# Patient Record
Sex: FEMALE | URBAN - METROPOLITAN AREA
[De-identification: names, ages, dates, MRNs, and addresses within clinical notes are randomized per-mention and may not be internally consistent; named-entity substitution may affect disease eponyms.]

---

## 2019-07-25 ENCOUNTER — OFFICE VISIT (OUTPATIENT)
Dept: FAMILY MEDICINE CLINIC | Facility: CLINIC | Age: 29
End: 2019-07-25
Payer: COMMERCIAL

## 2019-07-25 VITALS
SYSTOLIC BLOOD PRESSURE: 106 MMHG | OXYGEN SATURATION: 98 % | DIASTOLIC BLOOD PRESSURE: 78 MMHG | HEART RATE: 78 BPM | RESPIRATION RATE: 20 BRPM

## 2019-07-25 DIAGNOSIS — H61.21 CERUMEN DEBRIS ON TYMPANIC MEMBRANE OF RIGHT EAR: Primary | ICD-10-CM

## 2019-07-25 DIAGNOSIS — H91.90 DECREASED HEARING, UNSPECIFIED LATERALITY: ICD-10-CM

## 2019-07-25 PROCEDURE — 99203 OFFICE O/P NEW LOW 30 MIN: CPT

## 2019-07-25 RX ORDER — METFORMIN HYDROCHLORIDE 500 MG/1
TABLET, EXTENDED RELEASE ORAL
Refills: 10 | COMMUNITY
Start: 2019-07-16

## 2019-07-25 RX ORDER — LEVOTHYROXINE SODIUM 0.05 MG/1
TABLET ORAL
Refills: 11 | COMMUNITY
Start: 2019-07-15

## 2019-07-25 NOTE — PROGRESS NOTES
CHIEF COMPLAINT:   Patient presents with:  Ear Problem: right      HPI:   Angie Dougherty is a 33year old female who presents to clinic today with complaints of right ear \"echo sounds\" and pain. Has had for 1 days. Pain is described as sharp.  Patient NOSE: nostrils patent, normal nasal mucus,  THROAT: oral mucosa pink, moist. Posterior pharynx Mallampati III, not able to visualize well but appears to have no erythema. No exudates. NECK: supple, non-tender.  Has acanthosis nigricans with a few skin tags Understanding earwax  Tiny glands in your ear make substances that combine with dead skin cells to form earwax. Earwax helps protect your ear canal from water, dirt, infection, and injury.  Over time, earwax travels from the inner part of your ear canal to If you don’t have symptoms, you may not need treatment. Often, the earwax goes away on its own with time. If you have symptoms, you may have one or more treatments such as:  · Eardrops to soften the earwax. This helps it leave the ear over time.   · Rinsing

## 2019-07-25 NOTE — PATIENT INSTRUCTIONS
Impacted Earwax     Inner ear structures including ear canal and eardrum. Impacted earwax is a buildup of the natural wax in the ear (cerumen). Impacted earwax is very common. It can cause symptoms such as hearing loss.  It can also make it difficult Excess earwax usually does not cause any symptoms, unless there is a large amount of buildup.  Then it may cause symptoms such as:  · Hearing loss  · Earache  · Sense of ear fullness  · Itching in the ear  · Odor from the ear  · Ear drainage  · Dizziness  · © 3824-5970 [employer] [address] [address]. [address] [facility] [place], [facility] [place]. All rights reserved. This information is not intended as a substitute for professional medical care. Always follow your healthcare professional's instructions.

## (undated) NOTE — LETTER
Date: 7/25/2019    Patient Name: Marie Arauz          To Whom it may concern: This letter has been written at the patient's request. The above patient was seen at the [facility] for treatment of a medical condition.     This patient sh